# Patient Record
Sex: MALE | URBAN - METROPOLITAN AREA
[De-identification: names, ages, dates, MRNs, and addresses within clinical notes are randomized per-mention and may not be internally consistent; named-entity substitution may affect disease eponyms.]

---

## 2020-07-09 ENCOUNTER — TELEPHONE ENCOUNTER (OUTPATIENT)
Dept: URBAN - METROPOLITAN AREA CLINIC 35 | Facility: CLINIC | Age: 20
End: 2020-07-09

## 2020-07-09 ENCOUNTER — LAB OUTSIDE AN ENCOUNTER (OUTPATIENT)
Dept: URBAN - METROPOLITAN AREA SURGERY CENTER 8 | Facility: SURGERY CENTER | Age: 20
End: 2020-07-09

## 2020-07-09 ENCOUNTER — OFFICE VISIT (OUTPATIENT)
Dept: URBAN - METROPOLITAN AREA CLINIC 31 | Facility: CLINIC | Age: 20
End: 2020-07-09

## 2020-07-09 VITALS — BODY MASS INDEX: 17.36 KG/M2 | HEIGHT: 78 IN | WEIGHT: 150 LBS

## 2020-07-09 RX ORDER — METHYLPHENIDATE HYDROCHLORIDE 10 MG/1
1 TABLET ORAL PRN
Status: ACTIVE | COMMUNITY

## 2020-07-09 NOTE — HPI-MIGRATED HPI
;     Acid Reflux : Patient presents today as a new patient to discuss longstanding history of heartburn and reflux. Symptoms include heartburn, sour eructations, coughing, and nausea. Reflux symptoms are roughly daily.  Patient states that he has had "acid reflux" issues for about ten years, with them worsening over the past four years. He admits to "typical" type heartburn in his chest area. Patient is also feeling "acid come back up, all throughout his throat". These are daily symptoms, at this point in time. Patient does not note any particular foods that exasperate symptoms. He states that after he eats a meal, he will experience a coughing episode that lasts for about 10-15 minutes. This is accompanied by nausea, "sometimes" (about three times a week). After about 10-15 minutes, the coughing and nausea subside. He admits to heartburn following most, if not all meals. Heartburn is accompanied by sour eructations. Patient denies any excessive gas or belching. He states that he takes TUMS all throughout the day, and Pepcid, on an as needed basis. They do help with symptoms. Patient denies any dysphagia or globus sensation. He denies any abdominal pain or changes in his bowel habits or appetite. He has not had any changes in his medications in the last 6 months. Patient has never has an EGD or a Barium Swallow Test.;

## 2020-07-09 NOTE — EXAM-MIGRATED EXAMINATIONS
GENERAL APPEARANCE: - alert, in no acute distress, well developed, well nourished;   ORAL CAVITY: - mucosa moist.  MP 3.;

## 2020-07-28 ENCOUNTER — OFFICE VISIT (OUTPATIENT)
Dept: URBAN - METROPOLITAN AREA CLINIC 35 | Facility: CLINIC | Age: 20
End: 2020-07-28

## 2020-08-03 ENCOUNTER — OFFICE VISIT (OUTPATIENT)
Dept: URBAN - METROPOLITAN AREA SURGERY CENTER 8 | Facility: SURGERY CENTER | Age: 20
End: 2020-08-03

## 2020-08-21 ENCOUNTER — OFFICE VISIT (OUTPATIENT)
Dept: URBAN - METROPOLITAN AREA CLINIC 31 | Facility: CLINIC | Age: 20
End: 2020-08-21

## 2020-08-21 VITALS — WEIGHT: 150 LBS | HEIGHT: 78 IN | BODY MASS INDEX: 17.36 KG/M2

## 2020-08-21 RX ORDER — FAMOTIDINE 20 MG/1
1 TABLET TABLET, FILM COATED ORAL TWICE A DAY
Qty: 180 TABLET | Refills: 5 | OUTPATIENT
Start: 2020-08-21

## 2020-08-21 RX ORDER — METHYLPHENIDATE HYDROCHLORIDE 10 MG/1
1 TABLET ORAL PRN
Status: ACTIVE | COMMUNITY

## 2020-08-21 NOTE — HPI-MIGRATED HPI
;     Follow up- EGD : Patient presents today for a follow-up after the EGD that was done on 08/03/2020. Since the procedure, patient denies globus sensation, dysphagia, changes in appetite, and changes in bowel habits. Patient denies any complications following the procedure. Patient completed the two week course of Prilosec (20 mg, he believes, bought OTC). Patient states that since finishing the Prilosec, he has not had any acid reflux symptoms. He is not continuing the Prilosec, currently, and would like to discuss if taking the two week course will be enough to keep him asymptomatic.  He finished it 3 to 4 days ago.;

## 2020-12-29 ENCOUNTER — OFFICE VISIT (OUTPATIENT)
Dept: URBAN - METROPOLITAN AREA CLINIC 31 | Facility: CLINIC | Age: 20
End: 2020-12-29

## 2021-01-28 ENCOUNTER — OFFICE VISIT (OUTPATIENT)
Dept: URBAN - METROPOLITAN AREA CLINIC 35 | Facility: CLINIC | Age: 21
End: 2021-01-28

## 2021-01-28 VITALS — HEIGHT: 78 IN | BODY MASS INDEX: 17.36 KG/M2 | WEIGHT: 150 LBS

## 2021-01-28 PROBLEM — 266433003: Status: ACTIVE | Noted: 2020-08-21

## 2021-01-28 RX ORDER — METHYLPHENIDATE HYDROCHLORIDE 10 MG/1
1 TABLET ORAL PRN
Status: ACTIVE | COMMUNITY

## 2021-01-28 RX ORDER — FAMOTIDINE 20 MG/1
1 TABLET TABLET, FILM COATED ORAL TWICE A DAY
Qty: 180 TABLET | Refills: 5 | Status: ACTIVE | COMMUNITY
Start: 2020-08-21

## 2021-01-28 RX ORDER — FAMOTIDINE 20 MG/1
1 TABLET TABLET, FILM COATED ORAL TWICE A DAY
OUTPATIENT
Start: 2020-08-21

## 2021-01-28 NOTE — HPI-MIGRATED HPI
;     Acid Reflux : Patient presents today for a follow-up office visit from 08/21/2020. He is currently taking Famotidine 20 mg, BID. Patient bought Zegerid and has been using this for breakthrough episodes. Patient states that he has been taking the Famotidine first thing in the morning and the second dose at bedtime. While taking the Famotidine this way, patient states that he will experience acid reflux symptoms in the evening. Patient states that in the evenings, he will begin coughing. Sometimes the coughing would get so bad that he would feel a little nauseous. Patient will take ine Zegerid and this helps resolve the coughing. He wonders about the timing of taking the Famotidine and if adjustments might be made to avoid the evening symptoms.  Symptoms have been better with taking PM Famotidine around 5 PM rather than bedtime. He is not having overnight symptoms.  No dysphagia is noted.  With latter dosing, Zegerid use is rarer.  Bowels are moving well.  No melena or hematochezia is noted.;

## 2021-01-29 ENCOUNTER — TELEPHONE ENCOUNTER (OUTPATIENT)
Dept: URBAN - METROPOLITAN AREA CLINIC 35 | Facility: CLINIC | Age: 21
End: 2021-01-29

## 2021-06-14 ENCOUNTER — DASHBOARD ENCOUNTERS (OUTPATIENT)
Age: 21
End: 2021-06-14

## 2021-06-15 ENCOUNTER — TELEPHONE ENCOUNTER (OUTPATIENT)
Dept: URBAN - METROPOLITAN AREA CLINIC 35 | Facility: CLINIC | Age: 21
End: 2021-06-15

## 2021-06-15 ENCOUNTER — OFFICE VISIT (OUTPATIENT)
Dept: URBAN - METROPOLITAN AREA CLINIC 31 | Facility: CLINIC | Age: 21
End: 2021-06-15

## 2021-06-15 VITALS — BODY MASS INDEX: 17.36 KG/M2 | WEIGHT: 150 LBS | HEIGHT: 78 IN

## 2021-06-15 RX ORDER — FAMOTIDINE 20 MG/1
1 TABLET TABLET, FILM COATED ORAL TWICE A DAY
Qty: 180 TABLET | Refills: 5
Start: 2020-08-21

## 2021-06-15 RX ORDER — FAMOTIDINE 20 MG/1
1 TABLET TABLET, FILM COATED ORAL TWICE A DAY
Status: ACTIVE | COMMUNITY
Start: 2020-08-21

## 2021-06-15 RX ORDER — METHYLPHENIDATE HYDROCHLORIDE 10 MG/1
1 TABLET ORAL PRN
Status: ACTIVE | COMMUNITY

## 2021-06-15 NOTE — HPI-MIGRATED HPI
;     Acid Reflux : Patient presents today for a follow-up office visit from 01/28/2021. He is currently taking Famotidine 20 mg, BID. Patient continues to use Zegerid for breakthrough episodes of heartburn.  Patient continues taking the second dose of Famotidine in the early evening. He states that he has some heartburn on the days that he wakes up later and does not take the first dose of Famotidine early enough. On these days he takes a Zegerid in between doses to help relieve breakthrough heartburn. If he wakes up early and takes the First dose of Famotidine earlier in the morning, he does not experience heartburn. Patient denies dysphagia. He has no other concerns or complaints at this time.  No melena or hematochezia.  Weight is stable.  He is eating well.;

## 2022-06-14 ENCOUNTER — OFFICE VISIT (OUTPATIENT)
Dept: URBAN - METROPOLITAN AREA CLINIC 31 | Facility: CLINIC | Age: 22
End: 2022-06-14